# Patient Record
Sex: FEMALE | Race: OTHER | NOT HISPANIC OR LATINO
[De-identification: names, ages, dates, MRNs, and addresses within clinical notes are randomized per-mention and may not be internally consistent; named-entity substitution may affect disease eponyms.]

---

## 2022-10-13 ENCOUNTER — DASHBOARD ENCOUNTERS (OUTPATIENT)
Age: 32
End: 2022-10-13

## 2022-10-13 ENCOUNTER — LAB OUTSIDE AN ENCOUNTER (OUTPATIENT)
Dept: URBAN - METROPOLITAN AREA CLINIC 88 | Facility: CLINIC | Age: 32
End: 2022-10-13

## 2022-10-13 ENCOUNTER — WEB ENCOUNTER (OUTPATIENT)
Dept: URBAN - METROPOLITAN AREA CLINIC 88 | Facility: CLINIC | Age: 32
End: 2022-10-13

## 2022-10-13 ENCOUNTER — OFFICE VISIT (OUTPATIENT)
Dept: URBAN - METROPOLITAN AREA CLINIC 88 | Facility: CLINIC | Age: 32
End: 2022-10-13
Payer: MEDICAID

## 2022-10-13 VITALS
HEIGHT: 63 IN | SYSTOLIC BLOOD PRESSURE: 100 MMHG | BODY MASS INDEX: 23.14 KG/M2 | DIASTOLIC BLOOD PRESSURE: 71 MMHG | HEART RATE: 64 BPM | WEIGHT: 130.6 LBS | TEMPERATURE: 97.3 F

## 2022-10-13 DIAGNOSIS — K50.90 CROHN'S DISEASE WITHOUT COMPLICATION, UNSPECIFIED GASTROINTESTINAL TRACT LOCATION: ICD-10-CM

## 2022-10-13 PROBLEM — 71833008: Status: ACTIVE | Noted: 2022-10-13

## 2022-10-13 PROBLEM — 34000006: Status: ACTIVE | Noted: 2022-10-13

## 2022-10-13 PROCEDURE — 99203 OFFICE O/P NEW LOW 30 MIN: CPT | Performed by: REGISTERED NURSE

## 2022-10-13 RX ORDER — MESALAMINE 250 MG/1
2 CAPSULES CAPSULE ORAL
Status: ON HOLD | COMMUNITY

## 2022-10-13 RX ORDER — MESALAMINE 400 MG/1
1 CAPSULE CAPSULE, DELAYED RELEASE ORAL
Status: DISCONTINUED | COMMUNITY

## 2022-10-13 RX ORDER — FLUCONAZOLE 150 MG/1
TAKE 1 TABLET BY ORAL ROUTE REPEAT IN 3 DAYS TABLET ORAL
Qty: 2 EACH | Refills: 0 | Status: DISCONTINUED | COMMUNITY

## 2022-10-13 RX ORDER — METRONIDAZOLE 500 MG/1
TABLET ORAL
Qty: 14 TABLET | Status: DISCONTINUED | COMMUNITY

## 2022-10-13 RX ORDER — IBUPROFEN 800 MG/1
TAKE 1 TABLET BY MOUTH EVERY 8 HOURS AS NEEDED FOR PAIN TABLET, FILM COATED ORAL
Qty: 21 EACH | Refills: 0 | Status: DISCONTINUED | COMMUNITY

## 2022-10-13 RX ORDER — MISOPROSTOL 200 UG/1
TAKE 1 TABLET BY ORAL ROUTE THE NIGHT PRIOR TO PROCEDURE, THEN 1 TABLET THE MORNING OF IUD INSERTION TABLET ORAL
Qty: 2 EACH | Refills: 0 | Status: DISCONTINUED | COMMUNITY

## 2022-10-13 NOTE — HPI-TODAY'S VISIT:
Pt here for Crohn's. She was diagnosed in 2009. She was started on Pentasa at the time of diagnosis. She has taken it inconsistently since that time. She is not currently on any treatment. She has not seen GI for several years. She has no current GI complaints. She denies any abdominal pain, diarrhea, or rectal bleeding. Last colonoscopy was done in 2013 by a doctor in [place].

## 2022-10-31 LAB
A/G RATIO: 1.9
ALBUMIN: 4.3
ALKALINE PHOSPHATASE: 49
ALT (SGPT): 13
AST (SGOT): 15
BILIRUBIN, TOTAL: 0.4
BUN/CREATININE RATIO: (no result)
BUN: 11
C-REACTIVE PROTEIN, QUANT: 0.4
CALCIUM: 9.1
CARBON DIOXIDE, TOTAL: 27
CHLORIDE: 103
CREATININE: 0.67
EGFR: 118
GLOBULIN, TOTAL: 2.3
GLUCOSE: 70
HEMATOCRIT: 39.1
HEMOGLOBIN: 12.6
MCH: 31.4
MCHC: 32.2
MCV: 97.5
MPV: 11.9
PLATELET COUNT: 175
POTASSIUM: 4
PROTEIN, TOTAL: 6.6
RDW: 11.8
RED BLOOD CELL COUNT: 4.01
SED RATE BY MODIFIED: 2
SODIUM: 137
WHITE BLOOD CELL COUNT: 4.6

## 2022-11-09 ENCOUNTER — OFFICE VISIT (OUTPATIENT)
Dept: URBAN - METROPOLITAN AREA SURGERY CENTER 24 | Facility: SURGERY CENTER | Age: 32
End: 2022-11-09

## 2022-11-09 ENCOUNTER — CLAIMS CREATED FROM THE CLAIM WINDOW (OUTPATIENT)
Dept: URBAN - METROPOLITAN AREA SURGERY CENTER 24 | Facility: SURGERY CENTER | Age: 32
End: 2022-11-09
Payer: MEDICAID

## 2022-11-09 DIAGNOSIS — K92.1 ACUTE MELENA: ICD-10-CM

## 2022-11-09 PROCEDURE — G8907 PT DOC NO EVENTS ON DISCHARG: HCPCS | Performed by: INTERNAL MEDICINE

## 2022-11-09 PROCEDURE — 45378 DIAGNOSTIC COLONOSCOPY: CPT | Performed by: INTERNAL MEDICINE

## 2022-11-09 RX ORDER — MESALAMINE 250 MG/1
2 CAPSULES CAPSULE ORAL
Status: ON HOLD | COMMUNITY

## 2022-11-11 LAB — CALPROTECTIN, FECAL: 287
